# Patient Record
Sex: FEMALE | Race: WHITE | NOT HISPANIC OR LATINO | Employment: OTHER | URBAN - METROPOLITAN AREA
[De-identification: names, ages, dates, MRNs, and addresses within clinical notes are randomized per-mention and may not be internally consistent; named-entity substitution may affect disease eponyms.]

---

## 2019-07-29 ENCOUNTER — OFFICE VISIT (OUTPATIENT)
Dept: URGENT CARE | Facility: MEDICAL CENTER | Age: 69
End: 2019-07-29
Payer: MEDICARE

## 2019-07-29 VITALS
RESPIRATION RATE: 16 BRPM | BODY MASS INDEX: 30.91 KG/M2 | TEMPERATURE: 97 F | WEIGHT: 168 LBS | SYSTOLIC BLOOD PRESSURE: 150 MMHG | DIASTOLIC BLOOD PRESSURE: 67 MMHG | OXYGEN SATURATION: 100 % | HEIGHT: 62 IN | HEART RATE: 63 BPM

## 2019-07-29 DIAGNOSIS — H61.22 IMPACTED CERUMEN OF LEFT EAR: Primary | ICD-10-CM

## 2019-07-29 PROCEDURE — 99213 OFFICE O/P EST LOW 20 MIN: CPT | Performed by: PHYSICIAN ASSISTANT

## 2019-07-29 PROCEDURE — 69209 REMOVE IMPACTED EAR WAX UNI: CPT | Performed by: PHYSICIAN ASSISTANT

## 2019-07-29 PROCEDURE — G0463 HOSPITAL OUTPT CLINIC VISIT: HCPCS | Performed by: PHYSICIAN ASSISTANT

## 2019-07-29 RX ORDER — LEVOTHYROXINE SODIUM 0.1 MG/1
88 TABLET ORAL DAILY
COMMUNITY

## 2019-07-29 RX ORDER — CELECOXIB 200 MG/1
200 CAPSULE ORAL 2 TIMES DAILY
COMMUNITY

## 2019-07-29 RX ORDER — ATORVASTATIN CALCIUM 20 MG/1
20 TABLET, FILM COATED ORAL DAILY
COMMUNITY

## 2019-07-29 RX ORDER — PRAMIPEXOLE DIHYDROCHLORIDE 0.25 MG/1
0.25 TABLET ORAL 3 TIMES DAILY
COMMUNITY

## 2019-07-29 NOTE — PROGRESS NOTES
[facility]        NAME: Kurt Walls is a 77 y o  female  : 1950    MRN: [id number]  DATE: 2019  TIME: 11:09 AM    Assessment and Plan   Impacted cerumen of left ear [H61 22]  1  Impacted cerumen of left ear  Ear cerumen removal         Patient Instructions       Follow up with PCP in 3-5 days  Proceed to  ER if symptoms worsen  Chief Complaint     Chief Complaint   Patient presents with   Teri Delacruz     went swimming  now with water in left ear         History of Present Illness       27-year-old female presents with blocked up left ear  Symptoms started on  and continues to be blocked up  Denies any fevers aches or pains  Denies any sore throat or runny nose  Patient reports she was out swimming recently got water near but since then has been blocked up    Ear Fullness    There is pain in the left ear  This is a new problem  The current episode started yesterday  The problem occurs constantly  The problem has been unchanged  There has been no fever  The patient is experiencing no pain  Associated symptoms include hearing loss  Pertinent negatives include no coughing, ear discharge, rhinorrhea or vomiting  She has tried nothing for the symptoms  The treatment provided no relief  Review of Systems   Review of Systems   Constitutional: Negative  HENT: Positive for hearing loss  Negative for ear discharge and rhinorrhea  Respiratory: Negative  Negative for cough  Cardiovascular: Negative  Gastrointestinal: Negative  Negative for vomiting  Musculoskeletal: Negative  Neurological: Negative            Current Medications       Current Outpatient Medications:     atorvastatin (LIPITOR) 20 mg tablet, Take 20 mg by mouth daily, Disp: , Rfl:     celecoxib (CeleBREX) 200 mg capsule, Take 200 mg by mouth 2 (two) times a day, Disp: , Rfl:     conjugated estrogens (PREMARIN) 1 25 mg tablet, Take 1 25 mg by mouth daily, Disp: , Rfl:     levothyroxine 100 mcg tablet, Take 88 mcg by mouth daily, Disp: , Rfl:     pramipexole (MIRAPEX) 0 25 mg tablet, Take 0 25 mg by mouth 3 (three) times a day, Disp: , Rfl:     sertraline (ZOLOFT) 50 mg tablet, Take 50 mg by mouth daily, Disp: , Rfl:     Current Allergies     Allergies as of 2019    (No Known Allergies)            The following portions of the patient's history were reviewed and updated as appropriate: allergies, current medications, past family history, past medical history, past social history, past surgical history and problem list      Past Medical History:   Diagnosis Date    Anxiety     Depression     Disease of thyroid gland     High cholesterol     Restless leg        Past Surgical History:   Procedure Laterality Date    APPENDECTOMY       SECTION      CHOLECYSTECTOMY      HYSTERECTOMY      NEPHRECTOMY         History reviewed  No pertinent family history  Medications have been verified  Objective   /67   Pulse 63   Temp (!) 97 °F (36 1 °C) (Tympanic)   Resp 16   Ht 5' 2" (1 575 m)   Wt 76 2 kg (168 lb)   SpO2 100%   BMI 30 73 kg/m²          Physical Exam     Physical Exam   Constitutional: She is oriented to person, place, and time  She appears well-developed and well-nourished  No distress  HENT:   Head: Normocephalic and atraumatic  Right Ear: Hearing, tympanic membrane, external ear and ear canal normal    Left Ear: External ear normal  No drainage, swelling or tenderness  A foreign body (Cerumen impaction) is present  No middle ear effusion  Decreased hearing is noted  Nose: Nose normal    Mouth/Throat: Oropharynx is clear and moist  No oropharyngeal exudate  Eyes: Conjunctivae are normal  Right eye exhibits no discharge  Left eye exhibits no discharge  Neck: Normal range of motion  Neck supple  Cardiovascular: Intact distal pulses  Pulmonary/Chest: Effort normal  No respiratory distress  Musculoskeletal: Normal range of motion  Neurological: She is alert and oriented to person, place, and time  Skin: Skin is warm and dry  Psychiatric: She has a normal mood and affect  Nursing note and vitals reviewed  Ear cerumen removal  Date/Time: 7/29/2019 11:08 AM  Performed by: David Bowen PA-C  Authorized by: David Bowen PA-C     Patient location:  ED  Indications / Diagnosis:  Cerumen impaction  Other Assisting Provider: No    Consent:     Consent obtained:  Verbal    Consent given by:  Patient    Risks discussed:  Bleeding, dizziness, infection, incomplete removal, TM perforation and pain    Alternatives discussed:  No treatment  Universal protocol:     Procedure explained and questions answered to patient or proxy's satisfaction: yes      Patient identity confirmed:  Verbally with patient  Procedure details:     Location:  L ear    Procedure type: irrigation only      Approach:  External    Visualization (free text):  Cerumen impaction    Equipment used:  Ear lavage  Post-procedure details:     Complication:  None    Hearing quality:  Improved    Patient tolerance of procedure:   Tolerated well, no immediate complications  Comments:      TM is intact and pearly gray

## 2019-07-29 NOTE — PATIENT INSTRUCTIONS
Cerumen Impaction   WHAT YOU NEED TO KNOW:   What is cerumen impaction? Cerumen impaction is the blockage of the outer ear canal by tightly packed cerumen (earwax)  What causes cerumen impaction? Anything that affects the normal outward flow of cerumen may cause impaction  The following factors may make it more likely for earwax to become impacted:  · Advanced age     · Conditions that produce too much cerumen, such as keratosis and other skin diseases    · Narrow or abnormally shaped ear canals    · Use of a hearing aid    · Incorrect use of cotton swabs, or using needles, hair pins, or other objects to clean the ears  What are the signs and symptoms of cerumen impaction? · Trouble hearing    · Dizziness    · Ear fullness or a feeling that something is plugging up your ear    · Itchiness or pain in the ears    · Ringing in the ears  How is cerumen impaction diagnosed? Your healthcare provider will ask about your medical history  This includes any ear problems or procedures you may have had  Your healthcare provider will carefully check your ears using a scope with a light  Your healthcare provider may look for other problems, such as bleeding, infection, or injury  Your eardrums will be checked for tears or holes  Your healthcare provider may also test your hearing  How is cerumen impaction treated? The goal of treatment is to remove the hardened wax  The type of treatment to be used may depend on your age, symptoms, or risk factors  Ask your healthcare provider which of the following treatments may be best for you:  · Ear drops:  Ear drops may be used to clear or soften the impacted earwax  This may be used alone or in combination with a procedure to take out the earwax      · Procedures:  When the impaction can be clearly seen, removal may be done using any of the following:    ¨ Irrigation:  Warm water from a syringe is used to wash the wax out of the ear canal  Irrigation may not be used on people with an eardrum tear, infection, or who have had ear surgery  ¨ Suction:  A small plastic tube that is connected to a machine is used to suck the impacted wax out of your ear  ¨ Instruments:  Your healthcare provider may use a curette (scoop-like instrument) or forceps to remove the impacted wax  What are the risks of having a cerumen impaction? · Procedures to remove the wax may cause bleeding and infection  The ear canal may be scraped and scratched or the eardrum may be injured, which may cause loss of hearing  · Untreated impacted cerumen may cause your symptoms to become worse  If it is not removed, impacted cerumen may cause an infection, irritation, loss of hearing, or further ear problems  When should I contact my healthcare provider? · You have a fever  · You have trouble hearing or hear ringing noises  · You have questions or concerns about your condition or care  When should I seek immediate care? · You feel dizzy  · You have discharge or blood coming out of your ear  · Your ear pain does not go away or gets worse  CARE AGREEMENT:   You have the right to help plan your care  Learn about your health condition and how it may be treated  Discuss treatment options with your caregivers to decide what care you want to receive  You always have the right to refuse treatment  The above information is an  only  It is not intended as medical advice for individual conditions or treatments  Talk to your doctor, nurse or pharmacist before following any medical regimen to see if it is safe and effective for you  © 2017 [employer]  Information is for End User's use only and may not be sold, redistributed or otherwise used for commercial purposes  All illustrations and images included in CareNotes® are the copyrighted property of [employer]  or [employer]